# Patient Record
Sex: MALE | Race: WHITE | NOT HISPANIC OR LATINO | Employment: FULL TIME | URBAN - METROPOLITAN AREA
[De-identification: names, ages, dates, MRNs, and addresses within clinical notes are randomized per-mention and may not be internally consistent; named-entity substitution may affect disease eponyms.]

---

## 2017-03-23 ENCOUNTER — OFFICE VISIT (OUTPATIENT)
Dept: PODIATRY | Facility: CLINIC | Age: 42
End: 2017-03-23
Payer: COMMERCIAL

## 2017-03-23 VITALS
RESPIRATION RATE: 18 BRPM | DIASTOLIC BLOOD PRESSURE: 84 MMHG | SYSTOLIC BLOOD PRESSURE: 122 MMHG | HEIGHT: 70 IN | WEIGHT: 201.19 LBS | BODY MASS INDEX: 28.8 KG/M2 | HEART RATE: 73 BPM

## 2017-03-23 DIAGNOSIS — M72.2 PLANTAR FASCIITIS: Primary | ICD-10-CM

## 2017-03-23 DIAGNOSIS — L84 CORN OR CALLUS: ICD-10-CM

## 2017-03-23 PROCEDURE — 99213 OFFICE O/P EST LOW 20 MIN: CPT | Mod: 25,S$GLB,, | Performed by: PODIATRIST

## 2017-03-23 PROCEDURE — 99999 PR PBB SHADOW E&M-EST. PATIENT-LVL III: CPT | Mod: PBBFAC,,, | Performed by: PODIATRIST

## 2017-03-23 PROCEDURE — 1160F RVW MEDS BY RX/DR IN RCRD: CPT | Mod: S$GLB,,, | Performed by: PODIATRIST

## 2017-03-23 NOTE — PROGRESS NOTES
Subjective:      Patient ID: Vincent Brewster is a 41 y.o. male.    Chief Complaint: Follow-up (left foot pain)    Vincent Brewster returns to clinic today for followup of left arch pain.  Patient reports continued 4/10 sharp pain on first steps in the morning and late in the day despite injection, shoes, insoles, stretching and anti inflammatories as directed.  Patient reports that the injection only helped temporarily and has not noticed any significant improvement with the anti inflammatories. Patient also returns today with new recommended shoes and insoles.  He also inquires today about physical therapy because he notices pain when he massages certain areas and is in certain positions.  In addition he has been noticing some irritation to the top of his right fourth toe over the past month.  He believes it may be a pair of shoes that he wears rubbing on the toe but wanted to have that looked at as well.      Review of Systems   Constitution: Negative for chills and fever.   Cardiovascular: Negative for chest pain.   Respiratory: Negative for shortness of breath.    Gastrointestinal: Negative for nausea and vomiting.           Objective:      Physical Exam   Constitutional: He is oriented to person, place, and time. He appears well-developed and well-nourished. No distress.   Cardiovascular:   Pulses:       Dorsalis pedis pulses are 2+ on the right side, and 2+ on the left side.        Posterior tibial pulses are 2+ on the right side, and 2+ on the left side.   Capillary fill time 3 seconds to digits bilateral feet.   Musculoskeletal:   Lower extremities:    Deformities: None    Normal arch height and rectus feet bilaterally.     5/5 muscle strength and tone in all four quadrants bilaterally.     Pain-free range of motion in all four quadrants bilaterally.     Some pulling discomfort on maximal ankle joint dorsiflexion with some limitation bilateral feet.    No pain on side to side compression of the calcaneal body  bilateral feet.    Primary area of pain to palpation is the plantar medial calcaneal tubercle left heel and central arch and mild pain on palpation along the plantar rim of the calcaneus.    Neurological: He is alert and oriented to person, place, and time. He displays no Babinski's sign on the right side. He displays no Babinski's sign on the left side.   Plantar protective threshold sensation by touch via 5.07 SWMF present bilaterally.    Skin:   Lower extremities:    Normal turgor, texture, temperature bilaterally. Digital hair present bilaterally. Warm equally bilaterally.     Mild fullness at the plantarmedial heel left foot. No varicosities, pigmentary changes, wounds, drainage, malodor, erythema, interdigital maceration were noted.     Skin lesions: Right dorsal fourth digit keratotic buildup.    Nails are clear bilaterally.   Psychiatric: He has a normal mood and affect.   Nursing note and vitals reviewed.            Assessment:       Encounter Diagnoses   Name Primary?    Plantar fasciitis Yes    Corn or callus          Plan:       Vincent was seen today for follow-up.    Diagnoses and all orders for this visit:    Plantar fasciitis  -     Ambulatory Referral to Physical/Occupational Therapy    Corn or callus      I counseled the patient on his conditions, their implications and medical management.    Because of recalcitrant arch pain we discussed going ahead and trying physical therapy graston or astym treatment along with the usual stretching and massage.  He was referred to [facility] physical therapy which is convenient to his workplace and home.  We also reviewed continuing with current recommendations for orthotic insoles and motion controlling supportive shoes.  He will also continue with current stretching exercises and anti-inflammatories as needed.    Regarding the corn on the top of his right fourth toe he was guided on appropriate extra depth shoes and cushion roll foam and cut out adhesive pads to  offload pressure along the area of irritation.    He will return to clinic in 6 weeks for reevaluation and follow-up after physical therapy.  He will call if any problems arise.    .

## 2017-03-23 NOTE — MR AVS SNAPSHOT
"    [facility]  [address]  [place] [address]  Phone: [phone]  Fax: [phone]                  Vincent Brewster   3/23/2017 11:00 AM   Office Visit    Description:  Male : 1975   Provider:  Geni Clement DPM   Department:  [facility]           Reason for Visit     Follow-up           Diagnoses this Visit        Comments    Plantar fasciitis    -  Primary     Corn or callus                To Do List           Future Appointments        Provider Department Dept Phone    3/23/2017 11:00 AM Geni Clement DPM [facility] [phone]    2017 9:20 AM Geni Clement DPM [facility] [phone]      Goals (5 Years of Data)     None      [facility] On Call     [facility] On Call Nurse Care Line -  Assistance  Registered nurses in the [facility] On Call Center provide clinical advisement, health education, appointment booking, and other advisory services.  Call for this free service at [phone].             Medications           Message regarding Medications     Verify the changes and/or additions to your medication regime listed below are the same as discussed with your clinician today.  If any of these changes or additions are incorrect, please notify your healthcare provider.             Verify that the below list of medications is an accurate representation of the medications you are currently taking.  If none reported, the list may be blank. If incorrect, please contact your healthcare provider. Carry this list with you in case of emergency.           Current Medications     azelastine-fluticasone (DYMISTA) 137-50 mcg/spray Hoehne nassal spray     dextroamphetamine-amphetamine (ADDERALL XR) 10 MG 24 hr capsule Take 10 mg by mouth every morning.    fluoxetine (PROZAC) 10 MG capsule TK 1 C PO ONCE D IN THE MORNING           Clinical Reference Information           Your Vitals Were     BP Pulse Resp Height Weight BMI    122/84 73 18 5' 10" (1.778 m) " 91.3 kg (201 lb 2.7 oz) 28.86 kg/m2      Blood Pressure          Most Recent Value    BP  122/84      Allergies as of 3/23/2017     No Known Allergies      Immunizations Administered on Date of Encounter - 3/23/2017     None      Orders Placed During Today's Visit      Normal Orders This Visit    Ambulatory Referral to Physical/Occupational Therapy       [facility] Sign-Up     Activating your [facility] account is as easy as 1-2-3!     1) Visit [email or web address], select Sign Up Now, enter this activation code and your date of birth, then select Next.  [email or web address]  Expires: 5/7/2017 10:32 AM      2) Create a username and password to use when you visit [facility] in the future and select a security question in case you lose your password and select Next.    3) Enter your e-mail address and click Sign Up!    Additional Information  If you have questions, please e-mail [email or web address] or call [phone] to talk to our [facility] staff. Remember, [facility] is NOT to be used for urgent needs. For medical emergencies, dial 911.         Instructions    .ats       Language Assistance Services     ATTENTION: Language assistance services are available, free of charge. Please call [phone].      ATENCIÓN: Si habla español, tiene a coley disposición servicios gratuitos de asistencia lingüística. Llame al [phone].     CHÚ Ý: N?u b?n nói Ti?ng Vi?t, có các d?ch v? h? tr? ngôn ng? mi?n phí dành cho b?n. G?i s? [phone].         [facility] complies with applicable Federal civil rights laws and does not discriminate on the basis of race, color, national origin, age, disability, or sex.